# Patient Record
Sex: MALE | Race: WHITE | NOT HISPANIC OR LATINO | Employment: OTHER | URBAN - METROPOLITAN AREA
[De-identification: names, ages, dates, MRNs, and addresses within clinical notes are randomized per-mention and may not be internally consistent; named-entity substitution may affect disease eponyms.]

---

## 2021-05-31 ENCOUNTER — RECORDS - HEALTHEAST (OUTPATIENT)
Dept: ADMINISTRATIVE | Facility: CLINIC | Age: 86
End: 2021-05-31

## 2022-10-30 ENCOUNTER — ANESTHESIA EVENT (OUTPATIENT)
Dept: SURGERY | Facility: CLINIC | Age: 87
End: 2022-10-30
Payer: COMMERCIAL

## 2022-10-31 ENCOUNTER — HOSPITAL ENCOUNTER (OUTPATIENT)
Facility: CLINIC | Age: 87
Discharge: HOME | End: 2022-10-31
Attending: INTERNAL MEDICINE | Admitting: INTERNAL MEDICINE
Payer: COMMERCIAL

## 2022-10-31 ENCOUNTER — ANESTHESIA (OUTPATIENT)
Dept: SURGERY | Facility: CLINIC | Age: 87
End: 2022-10-31
Payer: COMMERCIAL

## 2022-10-31 VITALS
OXYGEN SATURATION: 100 % | WEIGHT: 165 LBS | BODY MASS INDEX: 25.9 KG/M2 | HEART RATE: 75 BPM | RESPIRATION RATE: 17 BRPM | HEIGHT: 67 IN | DIASTOLIC BLOOD PRESSURE: 71 MMHG | TEMPERATURE: 99.4 F | SYSTOLIC BLOOD PRESSURE: 136 MMHG

## 2022-10-31 LAB
SARS-COV-2 RNA RESP QL NAA+PROBE: NEGATIVE
UPPER GI ENDOSCOPY: NORMAL

## 2022-10-31 PROCEDURE — 370N000017 HC ANESTHESIA TECHNICAL FEE, PER MIN: Performed by: INTERNAL MEDICINE

## 2022-10-31 PROCEDURE — 250N000011 HC RX IP 250 OP 636: Performed by: NURSE ANESTHETIST, CERTIFIED REGISTERED

## 2022-10-31 PROCEDURE — U0005 INFEC AGEN DETEC AMPLI PROBE: HCPCS | Performed by: INTERNAL MEDICINE

## 2022-10-31 PROCEDURE — 999N000141 HC STATISTIC PRE-PROCEDURE NURSING ASSESSMENT: Performed by: INTERNAL MEDICINE

## 2022-10-31 PROCEDURE — 710N000012 HC RECOVERY PHASE 2, PER MINUTE: Performed by: INTERNAL MEDICINE

## 2022-10-31 PROCEDURE — 272N000001 HC OR GENERAL SUPPLY STERILE: Performed by: INTERNAL MEDICINE

## 2022-10-31 PROCEDURE — 258N000003 HC RX IP 258 OP 636: Performed by: ANESTHESIOLOGY

## 2022-10-31 PROCEDURE — 360N000075 HC SURGERY LEVEL 2, PER MIN: Performed by: INTERNAL MEDICINE

## 2022-10-31 RX ORDER — SPIRONOLACTONE 25 MG
TABLET ORAL
COMMUNITY

## 2022-10-31 RX ORDER — FENTANYL CITRATE 50 UG/ML
25 INJECTION, SOLUTION INTRAMUSCULAR; INTRAVENOUS
Status: CANCELLED | OUTPATIENT
Start: 2022-10-31

## 2022-10-31 RX ORDER — SODIUM CHLORIDE, SODIUM LACTATE, POTASSIUM CHLORIDE, CALCIUM CHLORIDE 600; 310; 30; 20 MG/100ML; MG/100ML; MG/100ML; MG/100ML
INJECTION, SOLUTION INTRAVENOUS CONTINUOUS
Status: CANCELLED | OUTPATIENT
Start: 2022-10-31

## 2022-10-31 RX ORDER — ATORVASTATIN CALCIUM 80 MG/1
80 TABLET, FILM COATED ORAL DAILY
COMMUNITY

## 2022-10-31 RX ORDER — ONDANSETRON 4 MG/1
4 TABLET, ORALLY DISINTEGRATING ORAL EVERY 30 MIN PRN
Status: CANCELLED | OUTPATIENT
Start: 2022-10-31

## 2022-10-31 RX ORDER — ISOSORBIDE DINITRATE 10 MG/1
10 TABLET ORAL
COMMUNITY

## 2022-10-31 RX ORDER — OXYCODONE HYDROCHLORIDE 5 MG/1
5 TABLET ORAL EVERY 4 HOURS PRN
Status: CANCELLED | OUTPATIENT
Start: 2022-10-31

## 2022-10-31 RX ORDER — PROPOFOL 10 MG/ML
INJECTION, EMULSION INTRAVENOUS CONTINUOUS PRN
Status: DISCONTINUED | OUTPATIENT
Start: 2022-10-31 | End: 2022-10-31

## 2022-10-31 RX ORDER — FENTANYL CITRATE 50 UG/ML
25 INJECTION, SOLUTION INTRAMUSCULAR; INTRAVENOUS EVERY 5 MIN PRN
Status: CANCELLED | OUTPATIENT
Start: 2022-10-31

## 2022-10-31 RX ORDER — LIDOCAINE 40 MG/G
CREAM TOPICAL
Status: DISCONTINUED | OUTPATIENT
Start: 2022-10-31 | End: 2022-10-31 | Stop reason: HOSPADM

## 2022-10-31 RX ORDER — MEPERIDINE HYDROCHLORIDE 50 MG/ML
12.5 INJECTION INTRAMUSCULAR; INTRAVENOUS; SUBCUTANEOUS
Status: CANCELLED | OUTPATIENT
Start: 2022-10-31

## 2022-10-31 RX ORDER — FAMOTIDINE 20 MG/1
20 TABLET, FILM COATED ORAL 2 TIMES DAILY
COMMUNITY

## 2022-10-31 RX ORDER — ONDANSETRON 2 MG/ML
4 INJECTION INTRAMUSCULAR; INTRAVENOUS EVERY 30 MIN PRN
Status: CANCELLED | OUTPATIENT
Start: 2022-10-31

## 2022-10-31 RX ORDER — SODIUM CHLORIDE, SODIUM LACTATE, POTASSIUM CHLORIDE, CALCIUM CHLORIDE 600; 310; 30; 20 MG/100ML; MG/100ML; MG/100ML; MG/100ML
INJECTION, SOLUTION INTRAVENOUS CONTINUOUS
Status: DISCONTINUED | OUTPATIENT
Start: 2022-10-31 | End: 2022-10-31 | Stop reason: HOSPADM

## 2022-10-31 RX ADMIN — PROPOFOL 200 MCG/KG/MIN: 10 INJECTION, EMULSION INTRAVENOUS at 12:30

## 2022-10-31 RX ADMIN — SODIUM CHLORIDE, POTASSIUM CHLORIDE, SODIUM LACTATE AND CALCIUM CHLORIDE: 600; 310; 30; 20 INJECTION, SOLUTION INTRAVENOUS at 09:49

## 2022-10-31 ASSESSMENT — ACTIVITIES OF DAILY LIVING (ADL)
ADLS_ACUITY_SCORE: 33
ADLS_ACUITY_SCORE: 35
ADLS_ACUITY_SCORE: 35

## 2022-10-31 NOTE — BRIEF OP NOTE
[facility]    Brief Operative Note    Pre-operative diagnosis: Gastroesophageal reflux [K21.9]  Post-operative diagnosis Dysphagia    Procedure: Procedure(s):  ESOPHAGOGASTRODUODENOSCOPY (EGD) with esophageal dilation  Surgeon: Surgeon(s) and Role:     * Shashi Tanner MD - Primary  Anesthesia: MAC   Estimated Blood Loss: None    Drains: None  Specimens: * No specimens in log *  Findings:    1. Hypertonic LES; dilated  2. Normal stomach  3. Normal duodenum    Complications: None.  Implants: * No implants in log *

## 2022-10-31 NOTE — ANESTHESIA CARE TRANSFER NOTE
Patient: Waylon Corey    Procedure: Procedure(s):  ESOPHAGOGASTRODUODENOSCOPY (EGD) with esophageal dilation       Diagnosis: Gastroesophageal reflux [K21.9]  Diagnosis Additional Information: No value filed.    Anesthesia Type:   MAC     Note:    Oropharynx: oropharynx clear of all foreign objects  Level of Consciousness: drowsy  Oxygen Supplementation: face mask  Level of Supplemental Oxygen (L/min / FiO2): 8  Independent Airway: airway patency satisfactory and stable  Dentition: dentition unchanged  Vital Signs Stable: post-procedure vital signs reviewed and stable  Report to RN Given: handoff report given  Patient transferred to: Phase II    Handoff Report: Identifed the Patient, Identified the Reponsible Provider, Reviewed the pertinent medical history, Discussed the surgical course, Reviewed Intra-OP anesthesia mangement and issues during anesthesia, Set expectations for post-procedure period and Allowed opportunity for questions and acknowledgement of understanding      Vitals:  Vitals Value Taken Time   BP     Temp     Pulse     Resp     SpO2         Electronically Signed By: JAKE Craven CRNA  October 31, 2022  12:52 PM

## 2022-10-31 NOTE — OP NOTE
Please see Provation note for details.    Shashi Tanner MD on 10/31/2022 at 12:51 PM  [facility]

## 2022-10-31 NOTE — INTERVAL H&P NOTE
"I have reviewed the surgical (or preoperative) H&P that is linked to this encounter, and examined the patient. There are no significant changes    Clinical Conditions Present on Arrival:  Clinically Significant Risk Factors Present on Admission                    # Overweight: Estimated body mass index is 25.84 kg/m  as calculated from the following:    Height as of this encounter: 1.702 m (5' 7\").    Weight as of this encounter: 74.8 kg (165 lb).       "

## 2022-10-31 NOTE — ANESTHESIA POSTPROCEDURE EVALUATION
Patient: Waylon Corey    Procedure: Procedure(s):  ESOPHAGOGASTRODUODENOSCOPY (EGD) with esophageal dilation       Anesthesia Type:  MAC    Note:  Disposition: Outpatient   Postop Pain Control: Uneventful            Sign Out: Well controlled pain   PONV: No   Neuro/Psych: Uneventful            Sign Out: Acceptable/Baseline neuro status   Airway/Respiratory: Uneventful            Sign Out: Acceptable/Baseline resp. status   CV/Hemodynamics: Uneventful            Sign Out: Acceptable CV status; No obvious hypovolemia; No obvious fluid overload   Other NRE: NONE   DID A NON-ROUTINE EVENT OCCUR? No           Last vitals:  Vitals Value Taken Time   /71 10/31/22 1305   Temp 37.4  C (99.4  F) 10/31/22 1305   Pulse 75 10/31/22 1305   Resp 17 10/31/22 1305   SpO2 100 % 10/31/22 1305       Electronically Signed By: Benjie Vanegas MD  October 31, 2022  1:19 PM

## 2022-10-31 NOTE — ANESTHESIA PREPROCEDURE EVALUATION
Anesthesia Pre-Procedure Evaluation    Patient: Waylon Corey   MRN: [id number] : 1935        Procedure : Procedure(s):  ESOPHAGOGASTRODUODENOSCOPY (EGD)          Past Medical History:   Diagnosis Date     Basal cell carcinoma       Past Surgical History:   Procedure Laterality Date     MOHS MICROGRAPHIC PROCEDURE        Allergies   Allergen Reactions     Nkda [No Known Drug Allergies]       Social History     Tobacco Use     Smoking status: Former     Types: Cigarettes     Smokeless tobacco: Never     Tobacco comments:     Stopped age 21   Substance Use Topics     Alcohol use: Not on file      Wt Readings from Last 1 Encounters:   No data found for Wt        Anesthesia Evaluation   Pt has had prior anesthetic.     No history of anesthetic complications       ROS/MED HX  ENT/Pulmonary:  - neg pulmonary ROS     Neurologic:  - neg neurologic ROS     Cardiovascular:     (+) --CAD ---    METS/Exercise Tolerance:     Hematologic:  - neg hematologic  ROS     Musculoskeletal:  - neg musculoskeletal ROS     GI/Hepatic:     (+) GERD, hiatal hernia,     Renal/Genitourinary:  - neg Renal ROS     Endo:  - neg endo ROS     Psychiatric/Substance Use:  - neg psychiatric ROS     Infectious Disease:  - neg infectious disease ROS     Malignancy:  - neg malignancy ROS     Other:            Physical Exam    Airway  airway exam normal      Mallampati: I    Neck ROM: full     Respiratory Devices and Support         Dental           Cardiovascular   cardiovascular exam normal       Rhythm and rate: regular and normal     Pulmonary   pulmonary exam normal        breath sounds clear to auscultation           OUTSIDE LABS:  CBC: No results found for: WBC, HGB, HCT, PLT  BMP: No results found for: NA, POTASSIUM, CHLORIDE, CO2, BUN, CR, GLC  COAGS: No results found for: PTT, INR, FIBR  POC: No results found for: BGM, HCG, HCGS  HEPATIC: No results found for: ALBUMIN, PROTTOTAL, ALT, AST, GGT, ALKPHOS, BILITOTAL, BILIDIRECT,  MAX  OTHER: No results found for: PH, LACT, A1C, KATHARINA, PHOS, MAG, LIPASE, AMYLASE, TSH, T4, T3, CRP, SED    Anesthesia Plan    ASA Status:  3      Anesthesia Type: MAC.              Consents    Anesthesia Plan(s) and associated risks, benefits, and realistic alternatives discussed. Questions answered and patient/representative(s) expressed understanding.    - Discussed:     - Discussed with:  Patient         Postoperative Care            Comments:                Benjie Vanegas MD

## 2024-06-10 ENCOUNTER — TELEPHONE (OUTPATIENT)
Dept: OPHTHALMOLOGY | Facility: CLINIC | Age: 89
End: 2024-06-10
Payer: COMMERCIAL

## 2024-06-10 ENCOUNTER — TRANSFERRED RECORDS (OUTPATIENT)
Dept: HEALTH INFORMATION MANAGEMENT | Facility: CLINIC | Age: 89
End: 2024-06-10
Payer: COMMERCIAL

## 2024-06-10 NOTE — TELEPHONE ENCOUNTER
Health Call Center    Phone Message    May a detailed message be left on voicemail: yes     Reason for Call: Appointment Intake    Referring Provider Name: Jessica Stack, [facility] *ref in progress*  Diagnosis and/or Symptoms: Biopsy of conjuctiva, due to irritation of symblepharon with cornea.    Writer unable to schedule biopsy. Please reach out to pt for further scheduling options and reach out to [facility] eye clinic for further details.    Thank You!    Action Taken: Message routed to:  [facility]    Travel Screening: Not Applicable     Date of Service:

## 2024-06-11 NOTE — TELEPHONE ENCOUNTER
Called and spoke to Juan Carlos Rangel has a hard time hearing on the phone - may need to repeat     Made him an appointment for July with dr. Crane     Discussed     Wait time     Billing . Insurance     Requested a new pt packet - we are not able to email due to HIPAA     Eliz Mcgrath Communication Facilitator on 6/11/2024 at 2:04 PM

## 2024-06-12 ENCOUNTER — TRANSCRIBE ORDERS (OUTPATIENT)
Dept: OTHER | Age: 89
End: 2024-06-12

## 2024-06-12 DIAGNOSIS — H11.239: Primary | ICD-10-CM

## 2024-07-03 ENCOUNTER — OFFICE VISIT (OUTPATIENT)
Dept: OPHTHALMOLOGY | Facility: CLINIC | Age: 89
End: 2024-07-03
Attending: OPHTHALMOLOGY
Payer: COMMERCIAL

## 2024-07-03 DIAGNOSIS — H11.232 SYMBLEPHARON, LEFT EYE: Primary | ICD-10-CM

## 2024-07-03 PROCEDURE — 88305 TISSUE EXAM BY PATHOLOGIST: CPT | Mod: 26 | Performed by: DERMATOLOGY

## 2024-07-03 PROCEDURE — 88346 IMFLUOR 1ST 1ANTB STAIN PX: CPT | Mod: 26 | Performed by: DERMATOLOGY

## 2024-07-03 PROCEDURE — 99204 OFFICE O/P NEW MOD 45 MIN: CPT | Mod: 25 | Performed by: OPHTHALMOLOGY

## 2024-07-03 PROCEDURE — 88346 IMFLUOR 1ST 1ANTB STAIN PX: CPT | Mod: TC | Performed by: OPHTHALMOLOGY

## 2024-07-03 PROCEDURE — 68100 BIOPSY CONJUNCTIVA: CPT | Performed by: OPHTHALMOLOGY

## 2024-07-03 PROCEDURE — G0463 HOSPITAL OUTPT CLINIC VISIT: HCPCS | Performed by: OPHTHALMOLOGY

## 2024-07-03 PROCEDURE — 92285 EXTERNAL OCULAR PHOTOGRAPHY: CPT | Performed by: OPHTHALMOLOGY

## 2024-07-03 PROCEDURE — 88350 IMFLUOR EA ADDL 1ANTB STN PX: CPT | Mod: 26 | Performed by: DERMATOLOGY

## 2024-07-03 RX ORDER — NEOMYCIN SULFATE, POLYMYXIN B SULFATE, AND DEXAMETHASONE 3.5; 10000; 1 MG/G; [USP'U]/G; MG/G
0.5 OINTMENT OPHTHALMIC 3 TIMES DAILY
Qty: 3.5 G | Refills: 11 | Status: SHIPPED | OUTPATIENT
Start: 2024-07-03

## 2024-07-03 ASSESSMENT — REFRACTION_WEARINGRX
OD_AXIS: 173
OS_SPHERE: +0.00
OS_CYLINDER: +0.25
OS_AXIS: 175
OD_CYLINDER: +2.00
OD_SPHERE: -0.75

## 2024-07-03 ASSESSMENT — SLIT LAMP EXAM - LIDS: COMMENTS: NORMAL

## 2024-07-03 ASSESSMENT — VISUAL ACUITY
OD_CC: 20/20
METHOD: SNELLEN - LINEAR
OS_PH_CC: 20/25
OS_CC: 20/40
CORRECTION_TYPE: GLASSES

## 2024-07-03 ASSESSMENT — CONF VISUAL FIELD
OS_SUPERIOR_TEMPORAL_RESTRICTION: 0
OS_INFERIOR_NASAL_RESTRICTION: 0
OD_NORMAL: 1
OD_SUPERIOR_NASAL_RESTRICTION: 0
OS_INFERIOR_TEMPORAL_RESTRICTION: 0
OD_INFERIOR_NASAL_RESTRICTION: 0
OS_SUPERIOR_NASAL_RESTRICTION: 0
OD_INFERIOR_TEMPORAL_RESTRICTION: 0
OD_SUPERIOR_TEMPORAL_RESTRICTION: 0
OS_NORMAL: 1

## 2024-07-03 ASSESSMENT — EXTERNAL EXAM - LEFT EYE: OS_EXAM: NORMAL

## 2024-07-03 ASSESSMENT — TONOMETRY
IOP_METHOD: ICARE
OD_IOP_MMHG: 09
OS_IOP_MMHG: 12

## 2024-07-03 NOTE — PROGRESS NOTES
Chief complaint   Evaluation of symblepharon     HPI    Waylon Corey 89 year old male       Chief Complaint(s) and History of Present Illness(es)       Corneal Evaluation    In both eyes.  This started 1 year ago.             Comments    Pt. States that he has been noticing worsening VA LE over the last year. Was diagnosed with AMD. No pain BE. Pt. Not sure why he is here today-was referred by [facility].   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, mild ERM not visually significant   CTL wearer:No  Glasses : Yes   Family Hx of eye disease: None     PMH     Past Medical History:   Diagnosis Date    Basal cell carcinoma        PSH     Past Surgical History:   Procedure Laterality Date    CATARACT IOL, RT/LT Bilateral     ESOPHAGOSCOPY, GASTROSCOPY, DUODENOSCOPY (EGD), COMBINED N/A 10/31/2022    Procedure: ESOPHAGOGASTRODUODENOSCOPY (EGD) with esophageal dilation;  Surgeon: Shashi Tanner MD;  Location: [facility] OR    MOHS MICROGRAPHIC PROCEDURE         Meds     Current Outpatient Medications   Medication Sig Dispense Refill    aspirin 81 MG tablet Take 1 tablet by mouth daily.      atorvastatin (LIPITOR) 80 MG tablet Take 80 mg by mouth daily      CALCIUM-VITAMIN D PO Take 1 tablet by mouth daily.      capsaicin (CAPSAICIN APR) 0.025 % CREA Apply 3 x a day to the left shoulder itch. Can cause burning feeling that improves with use. Can dilute with moisturizer. Use for 4-6 weeks. 56.6 g 11    Coenzyme Q10 (COQ10 PO) Take 1 capsule by mouth daily.      famotidine (PEPCID) 20 MG tablet Take 20 mg by mouth 2 times daily      GARLIC OIL PO Take 1 capsule by mouth daily.      isosorbide dinitrate (ISORDIL) 10 MG tablet Take 10 mg by mouth 3 times daily      ketoconazole (NIZORAL) 2 % shampoo Lather onto scalp and face and let sit for 3-5 minutes and rinse off. Use as  needed. Can use on the face more frequently as a face wash. 120 mL 11    ketoconazole (NIZORAL) 2 % shampoo Apply to scalp and then wash off after 5 minutes three times a week. 120 mL 11    Levothyroxine Sodium (SYNTHROID PO) Take 112 mcg by mouth daily      Lutein 20 MG CAPS       METOPROLOL TARTRATE PO Take 25 mg by mouth 2 times daily 1/2 of 50mg tablet      metroNIDAZOLE (METROCREAM) 0.75 % cream Apply once or twice a day to affected area of the face 45 g 11    metroNIDAZOLE (METROCREAM) 0.75 % cream Apply to face twice daily. 45 g 11    nitroGLYCERIN (NITROSTAT) 0.4 MG SL tablet Place 0.4 mg under the tongue every 5 minutes as needed.      Pantoprazole Sodium (PROTONIX PO) Take 1 tablet by mouth daily.       No current facility-administered medications for this visit.       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  R/B/A of surgical biopsy discussed patient agree to proceed today  Start maxitrol ointment 3 times daily for 2 weeks    # Skin lesion, right       Follow up:  Oph: 3 weeks        Attending Physician Attestation: Complete documentation of historical and exam elements from today's encounter can be found in the full encounter summary report (not reduplicated in this progress note). I personally obtained the chief complaint(s) and history of present illness. I confirmed and edited as necessary the review of systems, past medical/surgical history, family history, social history, and examination findings as documented by others; and I examined the patient myself. I personally reviewed the relevant tests, images, and reports as documented above. I formulated and edited as necessary the assessment and plan and discussed the findings and management plan with the patient and family. I personally reviewed the ophthalmic test(s) associated with this encounter, agree with the  interpretation(s) as documented by the resident/fellow, and have edited the corresponding report(s) as necessary. I was present for the entire procedure(s). - Malgorzata Crane M.D

## 2024-07-03 NOTE — NURSING NOTE
Chief Complaints and History of Present Illnesses   Patient presents with    Corneal Evaluation     Chief Complaint(s) and History of Present Illness(es)       Corneal Evaluation              Laterality: both eyes    Onset: 1 year ago              Comments    Pt. States that he has been noticing worsening VA LE over the last year. Was diagnosed with AMD. No pain BE. Pt. Not sure why he is here today-was referred by [facility].   Jessica Villalpando COT 8:18 AM July 3, 2024

## 2024-07-12 LAB
PATH REPORT.COMMENTS IMP SPEC: NORMAL
PATH REPORT.FINAL DX SPEC: NORMAL
PATH REPORT.GROSS SPEC: NORMAL
PATH REPORT.MICROSCOPIC SPEC OTHER STN: NORMAL
PATH REPORT.RELEVANT HX SPEC: NORMAL
PHOTO IMAGE: NORMAL

## 2024-07-22 ENCOUNTER — OFFICE VISIT (OUTPATIENT)
Dept: OPHTHALMOLOGY | Facility: CLINIC | Age: 89
End: 2024-07-22
Attending: OPHTHALMOLOGY
Payer: COMMERCIAL

## 2024-07-22 DIAGNOSIS — H11.232 SYMBLEPHARON, LEFT EYE: Primary | ICD-10-CM

## 2024-07-22 DIAGNOSIS — H04.123 DRY EYE SYNDROME OF BOTH EYES: ICD-10-CM

## 2024-07-22 DIAGNOSIS — H02.056: ICD-10-CM

## 2024-07-22 PROCEDURE — 99213 OFFICE O/P EST LOW 20 MIN: CPT | Mod: GC | Performed by: OPHTHALMOLOGY

## 2024-07-22 PROCEDURE — G0463 HOSPITAL OUTPT CLINIC VISIT: HCPCS | Performed by: OPHTHALMOLOGY

## 2024-07-22 RX ORDER — CYCLOSPORINE 0.5 MG/ML
1 EMULSION OPHTHALMIC 2 TIMES DAILY
Qty: 60 EACH | Refills: 11 | Status: SHIPPED | OUTPATIENT
Start: 2024-07-22

## 2024-07-22 ASSESSMENT — REFRACTION_WEARINGRX
OD_AXIS: 173
OS_AXIS: 175
OD_CYLINDER: +2.00
OS_CYLINDER: +0.25
OS_SPHERE: +0.00
OD_SPHERE: -0.75

## 2024-07-22 ASSESSMENT — TONOMETRY
OS_IOP_MMHG: 13
OD_IOP_MMHG: 8
IOP_METHOD: ICARE

## 2024-07-22 ASSESSMENT — VISUAL ACUITY
OS_CC: 20/40
OD_CC: 20/25
CORRECTION_TYPE: GLASSES
OS_CC+: -1
METHOD: SNELLEN - LINEAR
OD_CC+: -1

## 2024-07-22 ASSESSMENT — SLIT LAMP EXAM - LIDS: COMMENTS: NORMAL

## 2024-07-22 ASSESSMENT — EXTERNAL EXAM - LEFT EYE: OS_EXAM: NORMAL

## 2024-07-22 NOTE — NURSING NOTE
"Chief Complaints and History of Present Illnesses   Patient presents with    Follow Up     Symblepharon, left eye     Chief Complaint(s) and History of Present Illness(es)       Follow Up              Laterality: left eye    Associated symptoms: tearing.  Negative for dryness, eye pain, flashes and floaters    Treatments tried: ointment    Pain scale: 0/10    Comments: Symblepharon, left eye              Comments    Pt states vision is the same.  No pain.  Pt states he \"has the AMD\".  Karel gael as prescribed.  No other ocular meds.    LINDSAY See July 22, 2024 9:32 AM                       "

## 2024-07-22 NOTE — PROGRESS NOTES
"Chief complaint   Evaluation of symblepharon     HPI    Waylon Corey 89 year old male       Chief Complaint(s) and History of Present Illness(es)       Corneal Evaluation    In both eyes.  This started 1 year ago.             Comments    Pt. States that he has been noticing worsening VA LE over the last year. Was diagnosed with AMD. No pain BE. Pt. Not sure why he is here today-was referred by [facility].   Jessica Villalpando COT 8:18 AM July 3, 2024                        Interval hx 07/22/2024  Chief Complaint(s) and History of Present Illness(es)       Follow Up    In left eye.  Associated symptoms include tearing.  Negative for dryness, eye pain, flashes and floaters.  Treatments tried include ointment.  Pain was noted as 0/10. Additional comments: Symblepharon, left eye             Comments    Pt states vision is the same.  No pain.  Pt states he \"has the AMD\".  Karel gael as prescribed.  No other ocular meds.    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, mild ERM not visually significant   CTL wearer:No  Glasses : Yes   Family Hx of eye disease: None     PMH     Past Medical History:   Diagnosis Date    Basal cell carcinoma        PSH     Past Surgical History:   Procedure Laterality Date    CATARACT IOL, RT/LT Bilateral     ESOPHAGOSCOPY, GASTROSCOPY, DUODENOSCOPY (EGD), COMBINED N/A 10/31/2022    Procedure: ESOPHAGOGASTRODUODENOSCOPY (EGD) with esophageal dilation;  Surgeon: Shashi Tanner MD;  Location: [facility] OR    MOHS MICROGRAPHIC PROCEDURE         Meds     Current Outpatient Medications   Medication Sig Dispense Refill    aspirin 81 MG tablet Take 1 tablet by mouth daily.      atorvastatin (LIPITOR) 80 MG tablet Take 80 mg by mouth daily      CALCIUM-VITAMIN D PO Take 1 tablet by mouth daily.      capsaicin (CAPSAICIN APR) 0.025 % CREA Apply 3 x a day to " the left shoulder itch. Can cause burning feeling that improves with use. Can dilute with moisturizer. Use for 4-6 weeks. 56.6 g 11    Coenzyme Q10 (COQ10 PO) Take 1 capsule by mouth daily.      famotidine (PEPCID) 20 MG tablet Take 20 mg by mouth 2 times daily      GARLIC OIL PO Take 1 capsule by mouth daily.      isosorbide dinitrate (ISORDIL) 10 MG tablet Take 10 mg by mouth 3 times daily      ketoconazole (NIZORAL) 2 % shampoo Lather onto scalp and face and let sit for 3-5 minutes and rinse off. Use as needed. Can use on the face more frequently as a face wash. 120 mL 11    ketoconazole (NIZORAL) 2 % shampoo Apply to scalp and then wash off after 5 minutes three times a week. 120 mL 11    Levothyroxine Sodium (SYNTHROID PO) Take 112 mcg by mouth daily      Lutein 20 MG CAPS       METOPROLOL TARTRATE PO Take 25 mg by mouth 2 times daily 1/2 of 50mg tablet      metroNIDAZOLE (METROCREAM) 0.75 % cream Apply once or twice a day to affected area of the face 45 g 11    metroNIDAZOLE (METROCREAM) 0.75 % cream Apply to face twice daily. 45 g 11    neomycin-polymyxin-dexAMETHasone (MAXITROL) 3.5-08607-0.1 ophthalmic ointment Place 0.1429 Applications (0.5 g) Into the left eye 3 times daily 3.5 g 11    nitroGLYCERIN (NITROSTAT) 0.4 MG SL tablet Place 0.4 mg under the tongue every 5 minutes as needed.      Pantoprazole Sodium (PROTONIX PO) Take 1 tablet by mouth daily.       No current facility-administered medications for this visit.       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 Together with a negative immunofluorescence study, the findings present in the specimen are not entirely specific for a particular diagnosis. The presence of neutrophils within the epithelium and beneath the  epithelial surface of this transitional specimen may represent external trauma or an irritant phenomenon (which is slightly favored, given the presence of acanthosis). While the direct immunofluorescence study is negative, a subepidermal neutrophilic blistering condition such as cicatricial pemphigoid cannot be entirely excluded (as up to 40% of patients with this condition may have negative immunofluorescence studies - see PMID [id number]). Ongoing clinical correlation is recommended.     Imaging   Slit lamp     Drops Currently Taking   None     Assessment/Plan 07/22/2024   # Symblepharon, left   - Biopsy negative for malignancy   - Patient does report a history of rubbing the left eye because he thought his vision was blurry from AMD; states that he did not rub his right eye  - Etiology of symblepharon secondary to mild OCP vs from micro trauma   Bx reassuring    Plan  Continue erythromycin QID  -refer to plastics for entropion repair    #dry eye syndrome ou  Start restasis OU  It will help decrease the local inflamation that might be increasing the symblepharon    # Trichiasis, left  - Significant trichiasis of upper and lower, involving nasal 1/3 of lids  - Deferred mechanical epilation given extensive trichiasis  -refer to plastics as high risk for complications from the current trichiasis left eye    # Skin lesion, right       Follow up:  Oph: 6 months with local ophthalmologist, 1-2 months with plastics    Marianela Duvall MD  PGY-3  Department of Ophthalmology  July 22, 2024 10:26 AM   Attending Physician Attestation:  Complete documentation of historical and exam elements from today's encounter can be found in the full encounter summary report (not reduplicated in this progress note).  I personally obtained the chief complaint(s) and history of present illness.  I confirmed and edited as necessary the review of systems, past medical/surgical history, family history, social history, and examination findings as  documented by others; and I examined the patient myself.  I personally reviewed the relevant tests, images, and reports as documented above.  I formulated and edited as necessary the assessment and plan and discussed the findings and management plan with the patient and family. - Malgorzata Crane MD

## 2024-10-18 ENCOUNTER — TELEPHONE (OUTPATIENT)
Dept: OPHTHALMOLOGY | Facility: CLINIC | Age: 89
End: 2024-10-18
Payer: COMMERCIAL

## 2024-10-18 NOTE — TELEPHONE ENCOUNTER
Spoke with patient confirming appointment on 10/22/24. Provided appointment details over the phone.-Per Patient

## 2024-10-22 ENCOUNTER — OFFICE VISIT (OUTPATIENT)
Dept: OPHTHALMOLOGY | Facility: CLINIC | Age: 89
End: 2024-10-22
Payer: COMMERCIAL

## 2024-10-22 ENCOUNTER — PRE VISIT (OUTPATIENT)
Dept: OPHTHALMOLOGY | Facility: CLINIC | Age: 89
End: 2024-10-22

## 2024-10-22 DIAGNOSIS — H02.056: ICD-10-CM

## 2024-10-22 DIAGNOSIS — H02.016 CICATRICIAL ENTROPION OF LEFT EYE: Primary | ICD-10-CM

## 2024-10-22 PROCEDURE — 92285 EXTERNAL OCULAR PHOTOGRAPHY: CPT | Mod: GC | Performed by: OPHTHALMOLOGY

## 2024-10-22 PROCEDURE — 99214 OFFICE O/P EST MOD 30 MIN: CPT | Mod: GC | Performed by: OPHTHALMOLOGY

## 2024-10-22 ASSESSMENT — REFRACTION_WEARINGRX
OS_CYLINDER: +0.25
OS_SPHERE: PLANO
OD_SPHERE: -0.75
OD_CYLINDER: +2.00
OS_AXIS: 175
OD_AXIS: 173

## 2024-10-22 ASSESSMENT — VISUAL ACUITY
OS_CC: 20/40
OS_CC+: +1
METHOD: SNELLEN - LINEAR
CORRECTION_TYPE: GLASSES
OD_CC: 20/40
OD_CC+: +2

## 2024-10-22 ASSESSMENT — TONOMETRY
OS_IOP_MMHG: 12
IOP_METHOD: ICARE
OD_IOP_MMHG: 9

## 2024-10-22 ASSESSMENT — EXTERNAL EXAM - LEFT EYE: OS_EXAM: NORMAL

## 2024-10-22 ASSESSMENT — SLIT LAMP EXAM - LIDS: COMMENTS: NORMAL

## 2024-10-22 NOTE — LETTER
10/22/2024         RE:  :  MRN: Waylon Corey  1935  [id number]     Dear Dr. Malgorzata Crane,    Thank you for asking me to see your patient, Waylon Corey, for an oculoplastic   consultation.  My assessment and plan are below.  For further details, please see my attached clinic note.      Chief Complaint(s) and History of Present Illness(es)     Trichiasis Evaluation            Associated signs and symptoms: tearing.  Negative for eye pain    Treatments tried: eye drops    Comments: Waylon Corey is being seen for a consult today by the request   of Dr. Crane for Trichiasis, left eye.           Comments    Pt states he has no problems with his eyelashes.   Denies eye pain or discomfort.   Has not been using his eyedrops much.   Occasional tearing left eye.     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 Crnae for evaluation of cicatricial trichiasis and entropion in setting of possible OCP (conjunctial biopsy negative). Patient reports that he used to rub the eyes frequently.     Exam with extensive trichiasis left eye upper and lower lid, greatest superomedially where there is greater cicatricial entropion. There is also milder trichiasis and cicatricial entropion of the left lower lid. Cornea appears clear. Patient is not symptomatically bothered however he does report some epiphora of left eye.    Plan:  - Discussed options of RFE in clinic today vs. upper and lower eyelid entropion repair with intraoperative RFE    Patient disposition:   No follow-ups on file.     Berhane Trinidad,  MD  PGY-2 Resident  Department of Ophthalmology  October 22, 2024 10:25 AM        Again, thank you for allowing me to participate in the care of your patient.      Sincerely,    Maninder Dickerson MD  Department of Ophthalmology and Visual Neurosciences  [facility]    CC: Malgorzata Crane MD  [address] [facility] [address]  Via In Basket     Srinath Xiao MD  [facility] [address] [address]  Via In Basket

## 2024-10-22 NOTE — PROGRESS NOTES
Chief Complaint(s) and History of Present Illness(es)     Trichiasis Evaluation            Associated signs and symptoms: tearing.  Negative for eye pain    Treatments tried: eye drops    Comments: Waylon Corey is being seen for a consult today by the request   of Dr. Crane for Trichiasis, left eye.           Comments    Pt states he has no problems with his eyelashes.   Denies eye pain or discomfort.   Has not been using his eyedrops much.   Occasional tearing left eye.     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 Crane for evaluation of cicatricial trichiasis and entropion in setting of possible OCP (conjunctial biopsy negative). Patient reports that he used to rub the eyes frequently.     Exam with extensive trichiasis left eye upper and lower lid, greatest superomedially where there is greater cicatricial entropion. There is also milder trichiasis and cicatricial entropion of the left lower lid. Cornea appears clear. Patient is not symptomatically bothered however he does report some epiphora of left eye.    Plan:  - Discussed options of RFE in clinic today vs. upper and lower eyelid entropion repair with intraoperative RFE    Patient disposition:   No follow-ups on file.     Berhane Trinidad MD  PGY-2 Resident  Department of Ophthalmology  October 22, 2024 10:25 AM    Agree with above.   He has extensive symblepharon nasal left upper eyelid.   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   I recommend left upper eyelid lysis of symblepharon and reforming superior fornix. Possible need for excision of lash line, or anterior approach entropion repair as well. Sub conj steroid injection. Placement of Kontour contact lens.    In the interim, continue EES gael as prescribed by Dr. Crane.       Attending Physician Attestation: Complete documentation of historical and exam elements from today's encounter can be found in the full encounter summary report (not reduplicated in this progress note). I personally obtained the chief complaint(s) and history of present illness. I confirmed and edited as necessary the review of systems, past medical/surgical history, family history, social history, and examination findings as documented by others; and I examined the patient myself. I personally reviewed the relevant tests, images, and reports as documented above. I formulated and edited as necessary the assessment and plan and discussed the findings and management plan with the patient.  -Maninder Dickerson MD    Today with Waylon Corey, I reviewed the indications, risks, benefits, and alternatives of the proposed surgical procedure including, but not limited to, failure obtain the desired result  and need for additional surgery, bleeding, infection, injury to the eye. I emphasized the risk of recurrent entropion or corneal complications, and the remote possibility of permanent damage to any organ system or death with the use of anesthesia.  I provided multiple opportunities for the questions, answered all questions to the best of my ability, and confirmed that my answers and my discussion were understood.   Maninder Dickerson MD

## 2024-10-22 NOTE — NURSING NOTE
Chief Complaints and History of Present Illnesses   Patient presents with    Trichiasis Evaluation     Waylon Corey is being seen for a consult today by the request of Dr. Crane for Trichiasis, left eye.      Chief Complaint(s) and History of Present Illness(es)       Trichiasis Evaluation              Associated signs and symptoms: tearing.  Negative for eye pain    Treatments tried: eye drops    Comments: Waylon Corey is being seen for a consult today by the request of Dr. Crane for Trichiasis, left eye.               Comments    Pt states he has no problems with his eyelashes.   Denies eye pain or discomfort.   Has not been using his eyedrops much.   Occasional tearing left eye.     Pablo Cuevas 10:05 AM October 22, 2024

## 2024-10-25 ENCOUNTER — TELEPHONE (OUTPATIENT)
Dept: OPHTHALMOLOGY | Facility: CLINIC | Age: 89
End: 2024-10-25
Payer: COMMERCIAL

## 2024-10-25 NOTE — TELEPHONE ENCOUNTER
Called patient to schedule surgery with Dr. Dickerson    Spoke with: Waylon    Date(s) of Surgery: 12-6-24    Patient aware of approximate arrival time: No at Pt to get a call a couple of days prior to surgery     Location of surgery: [facility]     Pre-Op H&P: [facility]     Informed patient that they need to call to schedule pre-op H&P within 30 days of surgery date: Yes      Post-Op Appt Dates:        Discussed with patient pre-op RN will call 2-3 days prior to surgery with arrival time and instructions:  Yes       Standard Surgery Packet Sent: Yes 10/25/24  via Mail - Standard      Additional Information Sent in Packet:          Informed patient that they will need an adult  to bring patient home from surgery: Yes  : Wife does not drive, so he will ask his son or a friend to drive him         Additional Comments:        All patients questions were answered and was instructed to review surgical packet and call back [phone] with any questions or concerns.       Judy Knight on 10/25/2024 at 9:21 AM

## 2024-12-05 RX ORDER — ATORVASTATIN CALCIUM 80 MG/1
80 TABLET, FILM COATED ORAL DAILY
COMMUNITY

## 2024-12-05 RX ORDER — CHOLECALCIFEROL (VITAMIN D3) 50 MCG
1 TABLET ORAL DAILY
COMMUNITY

## 2024-12-05 RX ORDER — HYDROCORTISONE 25 MG/G
CREAM TOPICAL 2 TIMES DAILY
COMMUNITY

## 2024-12-06 ENCOUNTER — HOSPITAL ENCOUNTER (OUTPATIENT)
Facility: CLINIC | Age: 89
Discharge: HOME | End: 2024-12-06
Attending: OPHTHALMOLOGY | Admitting: OPHTHALMOLOGY
Payer: COMMERCIAL

## 2024-12-06 VITALS
TEMPERATURE: 97 F | RESPIRATION RATE: 15 BRPM | OXYGEN SATURATION: 98 % | SYSTOLIC BLOOD PRESSURE: 148 MMHG | DIASTOLIC BLOOD PRESSURE: 74 MMHG | HEIGHT: 67 IN | HEART RATE: 53 BPM | WEIGHT: 125.7 LBS | BODY MASS INDEX: 19.73 KG/M2

## 2024-12-06 DIAGNOSIS — H11.239: Primary | ICD-10-CM

## 2024-12-06 DIAGNOSIS — Z98.890 POSTOPERATIVE EYE STATE: ICD-10-CM

## 2024-12-06 PROCEDURE — 67924 REPAIR EYELID DEFECT: CPT | Mod: E2 | Performed by: OPHTHALMOLOGY

## 2024-12-06 PROCEDURE — 67830 REVISE EYELASHES: CPT | Mod: E1 | Performed by: OPHTHALMOLOGY

## 2024-12-06 PROCEDURE — 272N000001 HC OR GENERAL SUPPLY STERILE: Performed by: OPHTHALMOLOGY

## 2024-12-06 PROCEDURE — 999N000141 HC STATISTIC PRE-PROCEDURE NURSING ASSESSMENT: Performed by: OPHTHALMOLOGY

## 2024-12-06 PROCEDURE — 710N000012 HC RECOVERY PHASE 2, PER MINUTE: Performed by: OPHTHALMOLOGY

## 2024-12-06 PROCEDURE — 250N000009 HC RX 250: Performed by: OPHTHALMOLOGY

## 2024-12-06 PROCEDURE — 360N000076 HC SURGERY LEVEL 3, PER MIN: Performed by: OPHTHALMOLOGY

## 2024-12-06 PROCEDURE — 250N000011 HC RX IP 250 OP 636: Mod: JZ | Performed by: OPHTHALMOLOGY

## 2024-12-06 PROCEDURE — 68340 SEPARATE EYELID ADHESIONS: CPT | Mod: LT | Performed by: OPHTHALMOLOGY

## 2024-12-06 PROCEDURE — 710N000009 HC RECOVERY PHASE 1, LEVEL 1, PER MIN: Performed by: OPHTHALMOLOGY

## 2024-12-06 PROCEDURE — 370N000017 HC ANESTHESIA TECHNICAL FEE, PER MIN: Performed by: OPHTHALMOLOGY

## 2024-12-06 RX ORDER — ERYTHROMYCIN 5 MG/G
OINTMENT OPHTHALMIC PRN
Status: DISCONTINUED | OUTPATIENT
Start: 2024-12-06 | End: 2024-12-06 | Stop reason: HOSPADM

## 2024-12-06 RX ORDER — MOXIFLOXACIN 5 MG/ML
1 SOLUTION/ DROPS OPHTHALMIC 4 TIMES DAILY
Qty: 3 ML | Refills: 3 | Status: SHIPPED | OUTPATIENT
Start: 2024-12-06

## 2024-12-06 RX ORDER — NEOMYCIN POLYMYXIN B SULFATES AND DEXAMETHASONE 3.5; 10000; 1 MG/ML; [USP'U]/ML; MG/ML
SUSPENSION/ DROPS OPHTHALMIC
Qty: 5 ML | Refills: 0 | Status: SHIPPED | OUTPATIENT
Start: 2024-12-06 | End: 2024-12-06

## 2024-12-06 RX ORDER — TETRACAINE HYDROCHLORIDE 5 MG/ML
SOLUTION OPHTHALMIC PRN
Status: DISCONTINUED | OUTPATIENT
Start: 2024-12-06 | End: 2024-12-06 | Stop reason: HOSPADM

## 2024-12-06 RX ORDER — HYDROMORPHONE HCL IN WATER/PF 6 MG/30 ML
0.2 PATIENT CONTROLLED ANALGESIA SYRINGE INTRAVENOUS EVERY 5 MIN PRN
Status: DISCONTINUED | OUTPATIENT
Start: 2024-12-06 | End: 2024-12-06 | Stop reason: HOSPADM

## 2024-12-06 RX ORDER — DEXAMETHASONE SODIUM PHOSPHATE 4 MG/ML
4 INJECTION, SOLUTION INTRA-ARTICULAR; INTRALESIONAL; INTRAMUSCULAR; INTRAVENOUS; SOFT TISSUE
Status: DISCONTINUED | OUTPATIENT
Start: 2024-12-06 | End: 2024-12-06 | Stop reason: HOSPADM

## 2024-12-06 RX ORDER — NEOMYCIN POLYMYXIN B SULFATES AND DEXAMETHASONE 3.5; 10000; 1 MG/ML; [USP'U]/ML; MG/ML
SUSPENSION/ DROPS OPHTHALMIC
Qty: 5 ML | Refills: 0 | Status: SHIPPED | OUTPATIENT
Start: 2024-12-06

## 2024-12-06 RX ORDER — TRANEXAMIC ACID 10 MG/ML
1 INJECTION, SOLUTION INTRAVENOUS ONCE
Status: COMPLETED | OUTPATIENT
Start: 2024-12-06 | End: 2024-12-06

## 2024-12-06 RX ORDER — NALOXONE HYDROCHLORIDE 0.4 MG/ML
0.1 INJECTION, SOLUTION INTRAMUSCULAR; INTRAVENOUS; SUBCUTANEOUS
Status: DISCONTINUED | OUTPATIENT
Start: 2024-12-06 | End: 2024-12-06 | Stop reason: HOSPADM

## 2024-12-06 RX ORDER — SODIUM CHLORIDE, SODIUM LACTATE, POTASSIUM CHLORIDE, CALCIUM CHLORIDE 600; 310; 30; 20 MG/100ML; MG/100ML; MG/100ML; MG/100ML
INJECTION, SOLUTION INTRAVENOUS CONTINUOUS
Status: DISCONTINUED | OUTPATIENT
Start: 2024-12-06 | End: 2024-12-06 | Stop reason: HOSPADM

## 2024-12-06 RX ORDER — ERYTHROMYCIN 5 MG/G
OINTMENT OPHTHALMIC
Qty: 3.5 G | Refills: 1 | Status: SHIPPED | OUTPATIENT
Start: 2024-12-06

## 2024-12-06 RX ORDER — FENTANYL CITRATE 0.05 MG/ML
25 INJECTION, SOLUTION INTRAMUSCULAR; INTRAVENOUS EVERY 5 MIN PRN
Status: DISCONTINUED | OUTPATIENT
Start: 2024-12-06 | End: 2024-12-06 | Stop reason: HOSPADM

## 2024-12-06 RX ORDER — DEXAMETHASONE SODIUM PHOSPHATE 4 MG/ML
INJECTION, SOLUTION INTRA-ARTICULAR; INTRALESIONAL; INTRAMUSCULAR; INTRAVENOUS; SOFT TISSUE
Status: DISCONTINUED
Start: 2024-12-06 | End: 2024-12-06 | Stop reason: HOSPADM

## 2024-12-06 RX ORDER — HYDROMORPHONE HCL IN WATER/PF 6 MG/30 ML
0.4 PATIENT CONTROLLED ANALGESIA SYRINGE INTRAVENOUS EVERY 5 MIN PRN
Status: DISCONTINUED | OUTPATIENT
Start: 2024-12-06 | End: 2024-12-06 | Stop reason: HOSPADM

## 2024-12-06 RX ORDER — DEXAMETHASONE SODIUM PHOSPHATE 4 MG/ML
INJECTION, SOLUTION INTRA-ARTICULAR; INTRALESIONAL; INTRAMUSCULAR; INTRAVENOUS; SOFT TISSUE PRN
Status: DISCONTINUED | OUTPATIENT
Start: 2024-12-06 | End: 2024-12-06 | Stop reason: HOSPADM

## 2024-12-06 RX ORDER — FENTANYL CITRATE 0.05 MG/ML
50 INJECTION, SOLUTION INTRAMUSCULAR; INTRAVENOUS EVERY 5 MIN PRN
Status: DISCONTINUED | OUTPATIENT
Start: 2024-12-06 | End: 2024-12-06 | Stop reason: HOSPADM

## 2024-12-06 RX ORDER — ONDANSETRON 2 MG/ML
4 INJECTION INTRAMUSCULAR; INTRAVENOUS EVERY 30 MIN PRN
Status: DISCONTINUED | OUTPATIENT
Start: 2024-12-06 | End: 2024-12-06 | Stop reason: HOSPADM

## 2024-12-06 RX ORDER — ONDANSETRON 4 MG/1
4 TABLET, ORALLY DISINTEGRATING ORAL EVERY 30 MIN PRN
Status: DISCONTINUED | OUTPATIENT
Start: 2024-12-06 | End: 2024-12-06 | Stop reason: HOSPADM

## 2024-12-06 ASSESSMENT — ACTIVITIES OF DAILY LIVING (ADL)
ADLS_ACUITY_SCORE: 41

## 2024-12-06 NOTE — OP NOTE
Oculoplastic Surgery Operative Note    PREOPERATIVE DIAGNOSIS:    Left upper and lower eyelid cicatricial entropion, and upper and lower eyelid symblepharon     POSTOPERATIVE DIAGNOSIS:    Left upper and lower eyelid cicatricial entropion, and upper and lower eyelid symblepharon     PROCEDURE:   Left superior and inferior fornix conjunctivoplasty with release of symblepharon and reformation of the fornix. Excision of left upper eyelid nasal lash line and left lower eyelid entropion repair.     ANESTHESIA: MAC with local infiltration of 2% Lidocaine with epinephrine and 0.5% Marcaine in 50:50 mixture    SURGEON:  Maninder Dickerson MD    ASSISTANT: Berhane Trinidad MD    ESTIMATED BLOOD LOSS:  2 mL    INDICATIONS:  Waylon Corey presented with left severe cicatricial upper and lower eyelid entropion and shortening of his upper and lower fornix. Risks, benefits, and alternatives to the proposed procedure were discussed, and he elected to proceed.    PROCEDURE: Waylon Corey was brought to the operating room and placed supine on the operating table. Under monitored anesthesia care local anesthetic as above was infiltrated into the left upper and lower eyelid both transconjunctival he and transcutaneously.  He was prepped and draped in typical sterile fashion.  Attention was directed to the left side.  4-0 silk suture was placed to the upper eyelid margin.  There was dense symblepharon from the upper eyelid nasally to the bulbar conjunctiva all the way to the limbus and there was no visible superior fornix.  Similarly along the lower eyelid the nasal inferior fornix was obliterated due to symblepharon.  There was nasal trichiasis of both upper and lower eyelids.  Attention was first directed to the upper eyelid symblepharon.  The conjunctival scar was released from the upper eyelid margin and this dissection was carried all the way to the caruncle released into the superior fornix this did improve the upper eyelid  entropion and some, but that nasal quarter of lashes remained pointed downwards and cause trichiasis.  Decision was made to directly excise the lash line there.  A 15 blade was used to incise along the eyelid margin and the lash line along with the roots of those hair follicles were excised with Ravin scissors.  Once the conjunctival fornix was created superiorly attention was directed to the lower lid where the conjunctiva was incised several millimeters below the lid margin and symblepharon again was released all the way down into the inferior fornix releasing scar tissue and allowing the lid to roman into a more normal position.  Will send for fornix was recreated entropion repair was performed with a 6-0 Vicryl which was double-armed placed through the inferior fornix passed through the lower eyelid retractors and externalized under the lash line.  2 of these were placed and provided nice lid eversion.  To prevent recreation of symblepharon 4 mg of dexamethasone and a milliliter was injected subconjunctival in the upper and lower eyelids.  An 18 mm Kontour lens was placed over the eye.  Antibiotic ointment was administered.  We then released symblepharon and scar up into the superior nasal and fornix once the scar was. Waylon Corey tolerated the procedure well and left the operating room in stable condition.       Maninder Dickerson MD   This report was dictated using Dragon voice recognition software.

## 2024-12-06 NOTE — OR NURSING
U[ to BR voids light yellow X1- AOX3-VSS-O2 sats >92% RA- Good PO intake, Denies c/o- Pt and responsible adult verbalize understanding of discharge instructions, denies questions. Up in W/C - transported to door for discharge to home.

## 2024-12-06 NOTE — BRIEF OP NOTE
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 Sub conjunctival entropion., Left - Eye  Left upper eyelid entropion repair, Left - Eye    Surgeon: Surgeons and Role:     * Maninder Dickerson MD - Primary  Anesthesia: MAC   Estimated Blood Loss: Minimal    Drains: None  Specimens: * No specimens in log *  Findings:   None.  Complications: None.  Implants: * No implants in log *

## 2024-12-06 NOTE — OR NURSING
Pt dressed, up in recliner and transported to Phase 2. Pt tolerating oral fluids and applesauce. Pt denies pain. ABCDs intact.

## 2024-12-06 NOTE — DISCHARGE INSTRUCTIONS
Same Day Surgery Discharge Instructions for  Sedation and General Anesthesia     It's not unusual to feel dizzy, light-headed or faint for up to 24 hours after surgery or while taking pain medication.  If you have these symptoms: sit for a few minutes before standing and have someone assist you when you get up to walk or use the bathroom.    You should rest and relax for the next 24 hours. We recommend you make arrangements to have an adult stay with you for at least 24 hours after your discharge.  Avoid hazardous and strenuous activity.    DO NOT DRIVE any vehicle or operate mechanical equipment for 24 hours following the end of your surgery.  Even though you may feel normal, your reactions may be affected by the medication you have received.    Do not drink alcoholic beverages for 24 hours following surgery.     Slowly progress to your regular diet as you feel able. It's not unusual to feel nauseated and/or vomit after receiving anesthesia.  If you develop these symptoms, drink clear liquids (apple juice, ginger ale, broth, 7-up, etc. ) until you feel better.  If your nausea and vomiting persists for 24 hours, please notify your surgeon.      All narcotic pain medications, along with inactivity and anesthesia, can cause constipation. Drinking plenty of liquids and increasing fiber intake will help.    For any questions of a medical nature, call your surgeon.    Do not make important decisions for 24 hours.    If you had general anesthesia, you may have a sore throat for a couple of days related to the breathing tube used during surgery.  You may use Cepacol lozenges to help with this discomfort.  If it worsens or if you develop a fever, contact your surgeon.     If you feel your pain is not well managed with the pain medications prescribed by your surgeon, please contact your surgeon's office to let them know so they can address your concerns.          Post-operative Instructions  Ophthalmic Plastic and  Reconstructive Surgery    Maninder Dickerson M.D.     All instructions apply to the operated eye(s) or eyelid(s).    Wound care and personal care  ? Apply ice compresses and gentle pressure 15 minutes on, 15 minutes off, for 2 days. If you are sleeping, you don't need to wake up to ice. As long as there is no further bleeding, after two days, switch to warm water compresses for five minutes, four times a day until seen by your physician.   ? You may shower or wash your hair the day after surgery. Do not go swimming for at least 2 weeks to prevent contamination of your wounds.  ? You may go for walks and light activity is ok, but no heavy (over 15 pounds) lifting, bending or excessive straining for one week.   ? Do not apply make-up to the eyes or eyelids for 2 weeks after surgery.  ? Expect some swelling, bruising, black eye (even into the lower eyelids and cheeks). Also expect serum caking, crusting and discharge from the eye and/or incisions. A small amount of surface bleeding, and depending on the type of surgery, bleeding from the inside of the eyelid, is normal for the first 48 hours.  ? Avoid straining, bending at the waist, or lifting more than 15 pounds for 1 week. Sleeping with your head elevated, such as in a recliner, for the first several days can help swelling resolve more quickly.   ? Do continue to ambulate (walk) as you normally would - being sedentary after surgery can cause blood clots.   ? Your eye(s) and eyelid(s) may be painful and tender. This is normal after surgery.      Contact information and follow-up  ? Return to the Eye Clinic for a follow-up appointment with your physician as scheduled. If no appointment has been scheduled:   - [facility] eye clinic: [phone] for an appointment with Dr. Dickerson within 2 to 3 weeks from your date of surgery.    After hours or on weekends and holidays, call [phone] and ask to speak with the ophthalmologist on call.    An on call  person can be reached after hours for concerns. The on call doctor should not call in medication refill requests after hours or on weekends, so please plan accordingly. An effort has been made to provide adequate pain medications following every surgery, and refills will not be provided in most instances.     Medications  ? Restart all regular home medications and eye drops. If you take Plavix or Aspirin on a regular basis, wait for 72 hours after your surgery before restarting these in order to decrease the risk of bleeding complications.  ? Avoid aspirin and aspirin-like medications (Motrin, Aleve, Ibuprofen, Joanne-Brinkley etc) for 72 hours to reduce the risk of bleeding. You may take Tylenol (acetaminophen) for pain.  ? In addition to your home medications, take the following post-operative medications as prescribed by your physician.      ? Instill Maxitrol 3 times a day for 10 days.  ? Instill Vigamox in the left eye 4 times daily until follow up. This helps prevent infections of the cornea while you have a contact lens in the left eye.  ? If you have ocular irritation, you can use over the counter artificial tears such as Refresh, Systane, or Blink. Do not use Visine, Clear Eyes, or any other drop that gets the red out.   ? In many cases, postoperative discomfort can be managed with Tylenol alone. If narcotic pain medication was prescribed, take pain pills at prescribed frequency as needed for pain.            **If you have questions or concerns about your procedure,   call Dr. Dickerson at [phone] [facility] and [phone] [place]**

## 2024-12-17 ENCOUNTER — OFFICE VISIT (OUTPATIENT)
Dept: OPHTHALMOLOGY | Facility: CLINIC | Age: 89
End: 2024-12-17
Payer: COMMERCIAL

## 2024-12-17 DIAGNOSIS — Z98.890 POSTOPERATIVE EYE STATE: Primary | ICD-10-CM

## 2024-12-17 RX ORDER — MOXIFLOXACIN 5 MG/ML
1 SOLUTION/ DROPS OPHTHALMIC 4 TIMES DAILY
Qty: 3 ML | Refills: 3 | Status: SHIPPED | OUTPATIENT
Start: 2024-12-17

## 2024-12-17 ASSESSMENT — TONOMETRY
IOP_METHOD: ICARE
OD_IOP_MMHG: 7
OS_IOP_MMHG: 14

## 2024-12-17 ASSESSMENT — VISUAL ACUITY
METHOD: SNELLEN - LINEAR
OS_CC: 20/70
OD_CC: 20/30
OS_PH_CC: 20/60
CORRECTION_TYPE: GLASSES
OD_PH_CC: 20/30
OD_CC+: -2
OS_CC+: -1
OS_PH_CC+: -1

## 2024-12-17 ASSESSMENT — SLIT LAMP EXAM - LIDS: COMMENTS: NORMAL

## 2024-12-17 ASSESSMENT — REFRACTION_WEARINGRX
OS_SPHERE: PLANO
OS_CYLINDER: +0.25
OD_CYLINDER: +2.00
OD_AXIS: 173
OS_AXIS: 175
OD_SPHERE: -0.75

## 2024-12-17 NOTE — PROGRESS NOTES
"Chief Complaint(s) and History of Present Illness(es)     Post Op (Ophthalmology) Left Eye            Laterality: left eye    Associated symptoms: tearing and discharge.  Negative for eye pain    Pain scale: 0/10    Comments: Left conjunctivoplasty and recreation of superior and inferior   fornix. Sub conjunctival entropion.  Left upper eyelid entropion repair      Comments    No pain.    Pt having some tearing and discharge.  No change to vision.  Pt is mostly compliant with medications.   Pt has a hard time getting meds in the LE.  He also said his wife pokes him in the LE when she helps.  LINDSAY See December 17, 2024 12:26 PM    S/p Left superior and inferior fornix conjunctivoplasty with release of symblepharon and reformation of the fornix. Excision of left upper eyelid nasal lash line and left lower eyelid entropion repair. (12/6/24)    Nasal UL distichiasis with multiple lashes rubbing against cornea/conjunctiva, epilated today  Will replace contact lens and have patient continue vigamox QID OS    Patient Instructions   - Apply warm compresses for 1 minute two to three times a day until your bruising has resolved. You can continue this for one more month.   - Cool compresses can help with swelling and itching, you can alternate cool compresses with warm compresses if you find it helpful.  - Apply Aquaphor or Vaseline to the incision at bedtime until it is smooth.    - Stop neomycin/polymixin/dexamethasone drop. Continue vigamox drop four times daily.  - If you have symptoms of eye irritation, it is good to use over the counter artificial tears. Good brands include Refresh, Blink, and Systane. Do NOT get drops that are for \"red eyes.\"     Return in about 3 weeks (around 1/7/2025).      Attending Physician Attestation: Complete documentation of historical and exam elements from today's encounter can be found in the full encounter summary report (not reduplicated in this progress note). I personally " obtained the chief complaint(s) and history of present illness. I confirmed and edited as necessary the review of systems, past medical/surgical history, family history, social history, and examination findings as documented by others; and I examined the patient myself. I personally reviewed the relevant tests, images, and reports as documented above. I formulated and edited as necessary the assessment and plan and discussed the findings and management plan with the patient and family. I personally reviewed the ophthalmic test(s) associated with this encounter, agree with the interpretation(s) as documented by the resident/fellow, and have edited the corresponding report(s) as necessary. Maninder Dickerson MD

## 2024-12-17 NOTE — NURSING NOTE
Chief Complaints and History of Present Illnesses   Patient presents with    Post Op (Ophthalmology) Left Eye     Left conjunctivoplasty and recreation of superior and inferior fornix. Sub conjunctival entropion.  Left upper eyelid entropion repair         Chief Complaint(s) and History of Present Illness(es)       Post Op (Ophthalmology) Left Eye              Laterality: left eye    Associated symptoms: tearing and discharge.  Negative for eye pain    Pain scale: 0/10    Comments: Left conjunctivoplasty and recreation of superior and inferior fornix. Sub conjunctival entropion.  Left upper eyelid entropion repair                  Comments    No pain.    Pt having some tearing and discharge.  No change to vision.  Pt is mostly compliant with medications.   Pt has a hard time getting meds in the LE.  He also said his wife pokes him in the LE when she helps.  LINDSAY See December 17, 2024 12:26 PM

## 2024-12-17 NOTE — PATIENT INSTRUCTIONS
"- Apply warm compresses for 1 minute two to three times a day until your bruising has resolved. You can continue this for one more month.   - Cool compresses can help with swelling and itching, you can alternate cool compresses with warm compresses if you find it helpful.  - Apply Aquaphor or Vaseline to the incision at bedtime until it is smooth.    - Stop neomycin/polymixin/dexamethasone drop. Continue vigamox drop four times daily.  - If you have symptoms of eye irritation, it is good to use over the counter artificial tears. Good brands include Refresh, Blink, and Systane. Do NOT get drops that are for \"red eyes.\"     "

## 2025-01-14 ENCOUNTER — OFFICE VISIT (OUTPATIENT)
Dept: OPHTHALMOLOGY | Facility: CLINIC | Age: OVER 89
End: 2025-01-14
Payer: COMMERCIAL

## 2025-01-14 DIAGNOSIS — H02.056: ICD-10-CM

## 2025-01-14 DIAGNOSIS — H02.016 CICATRICIAL ENTROPION OF LEFT EYE: ICD-10-CM

## 2025-01-14 DIAGNOSIS — H11.232 SYMBLEPHARON, LEFT EYE: Primary | ICD-10-CM

## 2025-01-14 PROCEDURE — 99024 POSTOP FOLLOW-UP VISIT: CPT | Performed by: OPHTHALMOLOGY

## 2025-01-14 PROCEDURE — 67820 REVISE EYELASHES: CPT | Mod: 58 | Performed by: OPHTHALMOLOGY

## 2025-01-14 ASSESSMENT — SLIT LAMP EXAM - LIDS: COMMENTS: NORMAL

## 2025-01-14 ASSESSMENT — VISUAL ACUITY
METHOD: SNELLEN - LINEAR
OD_CC: 20/25
CORRECTION_TYPE: GLASSES

## 2025-01-14 ASSESSMENT — TONOMETRY
IOP_METHOD: ICARE
OD_IOP_MMHG: 8

## 2025-01-14 NOTE — PATIENT INSTRUCTIONS
Follow up in about 4 weeks at [facility] to have Dr. Xiao change your contact lens and pluck lashes. I would like to see you in 8 weeks, and it will be important that you come with a . We will try to excise the area of lashes rubbing on your eye at that visit. Continue using your moxifloxacin drops in the left eye three times a day.

## 2025-01-14 NOTE — LETTER
2025         RE:  :  MRN: Waylon Corey  [id number]  [id number]     Dear Dr. Xiao    Thank you for asking me to see your patient, Waylon Corey, for an oculoplastic   consultation.  My assessment and plan are below.  For further details, please see my attached clinic note.      Chief Complaint(s) and History of Present Illness(es)     Post Op (Ophthalmology) Left Eye            Laterality: left eye    Onset: 1 month ago    Severity: mild    Frequency: constantly    Course: gradually improving    Response to treatment: mild improvement    Pain scale: 0/10    Comments: Status post Left superior and inferior fornix conjunctivoplasty   with release of symblepharon and reformation of the fornix. Excision of   left upper eyelid nasal lash line and left lower eyelid entropion repair.   24.            Comments    Not sure if it is healing as he does not know what normal is any more.    Does not see well out of left eye.  If he is driving and makes left hand   turn he does not see as well as it is blurry.  Denies pain.      Maria Fernanda Richardson on 2025 at 2:02 PM                   Assessment & Plan     Waylon Corey is a 89 year old male with the following diagnoses:   Encounter Diagnoses   Name Primary?    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 He underwent release of symblepharon and reformation of the superior and inferior fornix 2024. His lower eyelid is nicely everted, but he has developed recurrent entropion of the nasal upper eyelid and a dense band of symbelpharon. DIF has been negative for OCP.     About 10 lashes epilated nasal left upper eyelid today. Replaced his bandage contact lens.     I asked that he see Dr. Xiao for bandage contact lens replacement and epilation in about 3-4 weeks then return in about 6-8 weeks for in clinic pentagonal wedge + steroid  injection, can send for DIF again as well as in formalin. Release symbelpharon and sub conj steroid injection. Place contact lens.         Patient disposition:   Return in about 8 weeks (around 3/11/2025) for Procedure: TASHI wedge and steroid injection (need Kleber and formalin). Kontour lens..         Again, thank you for allowing me to participate in the care of your patient.      Sincerely,    Maninder Dickerson MD  Department of Ophthalmology and Visual Neurosciences  [facility]    CC: Srinath Xiao MD  [facility]  [address]   [place] [address]  Via Fax: [phone]

## 2025-01-14 NOTE — NURSING NOTE
Chief Complaints and History of Present Illnesses   Patient presents with    Post Op (Ophthalmology) Left Eye     Status post Left superior and inferior fornix conjunctivoplasty with release of symblepharon and reformation of the fornix. Excision of left upper eyelid nasal lash line and left lower eyelid entropion repair. 12/6/24.       Chief Complaint(s) and History of Present Illness(es)       Post Op (Ophthalmology) Left Eye              Laterality: left eye    Onset: 1 month ago    Severity: mild    Frequency: constantly    Course: gradually improving    Response to treatment: mild improvement    Pain scale: 0/10    Comments: Status post Left superior and inferior fornix conjunctivoplasty with release of symblepharon and reformation of the fornix. Excision of left upper eyelid nasal lash line and left lower eyelid entropion repair. 12/6/24.                Comments    Not sure if it is healing as he does not know what normal is any more.  Does not see well out of left eye.  If he is driving and makes left hand turn he does not see as well as it is blurry.  Denies pain.      Maria Fernanda Richardson on 1/14/2025 at 2:02 PM

## 2025-01-14 NOTE — PROGRESS NOTES
Chief Complaint(s) and History of Present Illness(es)     Post Op (Ophthalmology) Left Eye            Laterality: left eye    Onset: 1 month ago    Severity: mild    Frequency: constantly    Course: gradually improving    Response to treatment: mild improvement    Pain scale: 0/10    Comments: Status post Left superior and inferior fornix conjunctivoplasty   with release of symblepharon and reformation of the fornix. Excision of   left upper eyelid nasal lash line and left lower eyelid entropion repair.   12/6/24.            Comments    Not sure if it is healing as he does not know what normal is any more.    Does not see well out of left eye.  If he is driving and makes left hand   turn he does not see as well as it is blurry.  Denies pain.      Maria Fernanda Richardson on 1/14/2025 at 2:02 PM                   Assessment & Plan     Waylon Corey is a 89 year old male with the following diagnoses:   Encounter Diagnoses   Name Primary?    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 He underwent release of symblepharon and reformation of the superior and inferior fornix 12/6/2024. His lower eyelid is nicely everted, but he has developed recurrent entropion of the nasal upper eyelid and a dense band of symbelpharon. DIF has been negative for OCP.     About 10 lashes epilated nasal left upper eyelid today. Replaced his bandage contact lens.     I asked that he see Dr. Xiao for bandage contact lens replacement and epilation in about 3-4 weeks then return in about 6-8 weeks for in clinic pentagonal wedge + steroid injection, can send for DIF again as well as in formalin. Release symbelpharon and sub conj steroid injection. Place contact lens.         Patient disposition:   Return in about 8 weeks (around 3/11/2025) for Procedure: TASHI wedge and steroid injection (need Kleber and formalin).  Kontour lens..        Attending Physician Attestation: Complete documentation of historical and exam elements from today's encounter can be found in the full encounter summary report (not reduplicated in this progress note). I personally obtained the chief complaint(s) and history of present illness. I confirmed and edited as necessary the review of systems, past medical/surgical history, family history, social history, and examination findings as documented by others; and I examined the patient myself. I personally reviewed the relevant tests, images, and reports as documented above. I formulated and edited as necessary the assessment and plan and discussed the findings and management plan with the patient.  -Maninder Dickerson MD

## 2025-02-21 ENCOUNTER — TRANSFERRED RECORDS (OUTPATIENT)
Dept: HEALTH INFORMATION MANAGEMENT | Facility: CLINIC | Age: OVER 89
End: 2025-02-21
Payer: COMMERCIAL

## 2025-03-18 ENCOUNTER — OFFICE VISIT (OUTPATIENT)
Dept: OPHTHALMOLOGY | Facility: CLINIC | Age: OVER 89
End: 2025-03-18
Payer: COMMERCIAL

## 2025-03-18 DIAGNOSIS — H50.89 RESTRICTIVE STRABISMUS: ICD-10-CM

## 2025-03-18 DIAGNOSIS — H11.232 SYMBLEPHARON, LEFT EYE: Primary | ICD-10-CM

## 2025-03-18 DIAGNOSIS — H02.056 TRICHIASIS OF LEFT EYE WITHOUT ENTROPION: ICD-10-CM

## 2025-03-18 PROCEDURE — 67961 REVISION OF EYELID: CPT | Mod: E1 | Performed by: OPHTHALMOLOGY

## 2025-03-18 PROCEDURE — 88342 IMHCHEM/IMCYTCHM 1ST ANTB: CPT | Mod: TC | Performed by: OPHTHALMOLOGY

## 2025-03-18 PROCEDURE — 88346 IMFLUOR 1ST 1ANTB STAIN PX: CPT | Mod: 26 | Performed by: DERMATOLOGY

## 2025-03-18 PROCEDURE — 68340 SEPARATE EYELID ADHESIONS: CPT | Mod: LT | Performed by: OPHTHALMOLOGY

## 2025-03-18 PROCEDURE — 88305 TISSUE EXAM BY PATHOLOGIST: CPT | Mod: 26 | Performed by: DERMATOLOGY

## 2025-03-18 PROCEDURE — 88312 SPECIAL STAINS GROUP 1: CPT | Mod: 26 | Performed by: DERMATOLOGY

## 2025-03-18 PROCEDURE — 88346 IMFLUOR 1ST 1ANTB STAIN PX: CPT | Mod: TC | Performed by: OPHTHALMOLOGY

## 2025-03-18 PROCEDURE — 88350 IMFLUOR EA ADDL 1ANTB STN PX: CPT | Mod: 26 | Performed by: DERMATOLOGY

## 2025-03-18 PROCEDURE — 88305 TISSUE EXAM BY PATHOLOGIST: CPT | Mod: TC | Performed by: OPHTHALMOLOGY

## 2025-03-18 PROCEDURE — 88342 IMHCHEM/IMCYTCHM 1ST ANTB: CPT | Mod: 26 | Performed by: DERMATOLOGY

## 2025-03-18 PROCEDURE — 67820 REVISE EYELASHES: CPT | Mod: E2 | Performed by: OPHTHALMOLOGY

## 2025-03-18 RX ORDER — ERYTHROMYCIN 5 MG/G
OINTMENT OPHTHALMIC ONCE
Status: COMPLETED | OUTPATIENT
Start: 2025-03-18 | End: 2025-03-18

## 2025-03-18 RX ORDER — PREDNISOLONE ACETATE 10 MG/ML
1 SUSPENSION/ DROPS OPHTHALMIC 4 TIMES DAILY
Qty: 10 ML | Refills: 0 | Status: SHIPPED | OUTPATIENT
Start: 2025-03-18 | End: 2025-04-08

## 2025-03-18 RX ORDER — MOXIFLOXACIN 5 MG/ML
1 SOLUTION/ DROPS OPHTHALMIC 3 TIMES DAILY
Qty: 3 ML | Refills: 0 | Status: SHIPPED | OUTPATIENT
Start: 2025-03-18

## 2025-03-18 RX ORDER — TRIAMCINOLONE ACETONIDE 40 MG/ML
40 INJECTION, SUSPENSION INTRA-ARTICULAR; INTRAMUSCULAR ONCE
Status: COMPLETED | OUTPATIENT
Start: 2025-03-18 | End: 2025-03-18

## 2025-03-18 RX ADMIN — ERYTHROMYCIN 1 G: 5 OINTMENT OPHTHALMIC at 12:24

## 2025-03-18 RX ADMIN — TRIAMCINOLONE ACETONIDE 40 MG: 40 INJECTION, SUSPENSION INTRA-ARTICULAR; INTRAMUSCULAR at 12:25

## 2025-03-18 NOTE — PROGRESS NOTES
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  He has previously undergone lysis of symblepharon and biopsy which was inconclusive.  Unfortunately he developed severe recurrence.  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  We discussed response alternatives to the post procedure and he elected proceed.    Description of procedure:    Waylon Corey was brought to the minor procedure room and laid supine on the table.  1% lidocaine with epinephrine was injected into the left upper eyelid as well as the left medial canthal area in the area of dense symblepharon and left lower eyelid.  He was prepped and draped in typical sterile fashion.  Attention was directed to the left side.  Ravin scissors were used to lyse symblepharon starting at the upper eyelid margin releasing the upper eyelid margin from the bulbar conjunctiva extending along the area of the caruncle and extending into the nasal lower eyelid.  Once adequate conjunctival release was achieved forced ductions were performed and the globe was still restricted in abduction.  Further release was carried out of scar tissue going back until I reached the area of the medial rectus muscle.  Once there was adequate release he was asked to abduct his eye and there was much improved abduction.  Kenalog-40 was injected into the upper eyelid lower eyelid and medial canthal area in the areas of the symblepharon.  Attention was directed to the area of dense trichiasis of the nasal left upper eyelid.  The area was marked out with a marking pen involved about 20% of the upper eyelid margin.  Ravin scissors were used to incise through the eyelid margin and the area was resected.  The specimen was split in half and half was sent in formalin in the second half in Kleber medium for evaluation  for mucous membrane pemphigoid.  Hemostasis was assured.  The lid margin was reconstructed with a vertical mattress 6-0 Vicryl suture and the tarsal plate was reconstructed with lamellar 6-0 Vicryl sutures the lash line was also reconstructed with the same 6-0 Vicryl suture.  Skin was closed with 6-0 plain gut sutures.  There were several misdirected lashes involving the lower eyelid.  These were epilated with cilia forceps.  Erythromycin ophthalmic ointment was applied.  A contour 18 mm lens was applied.  A prescription for moxifloxacin and Pred forte was also provided.  He was instructed to continue this until his follow-up visit when his contact lenses removed. I was present for the entire procedure. Maninder Dickerson MD

## 2025-03-18 NOTE — PATIENT INSTRUCTIONS
Use cold compresses for the first 48 hours while awake to minimize bruising and swelling. It works well to put a washcloth in a bowl of ice water and use the cold washcloth.     Use a warm compress 5-10 minutes 4 times per day for the next 2 days or as needed prior to next appointment.     Apply the prescribed/provided ointment to the incision three times a day for 5 days.      Apply Vigamox and Pred Forte to left eye 4 times per day until your next appointment.

## 2025-04-08 ENCOUNTER — OFFICE VISIT (OUTPATIENT)
Dept: OPHTHALMOLOGY | Facility: CLINIC | Age: OVER 89
End: 2025-04-08
Payer: COMMERCIAL

## 2025-04-08 DIAGNOSIS — H02.056 TRICHIASIS OF LEFT EYE WITHOUT ENTROPION: Primary | ICD-10-CM

## 2025-04-08 DIAGNOSIS — H11.232 SYMBLEPHARON, LEFT EYE: ICD-10-CM

## 2025-04-08 RX ORDER — PREDNISOLONE ACETATE 10 MG/ML
SUSPENSION/ DROPS OPHTHALMIC
Qty: 10 ML | Refills: 0 | Status: SHIPPED | OUTPATIENT
Start: 2025-04-08 | End: 2025-04-29

## 2025-04-08 ASSESSMENT — VISUAL ACUITY
OD_CC: 20/25
OS_PH_CC: 20/100
METHOD: SNELLEN - LINEAR
CORRECTION_TYPE: GLASSES
OS_CC: 20/100
OS_PH_CC+: +1
OD_CC+: -3

## 2025-04-08 ASSESSMENT — SLIT LAMP EXAM - LIDS: COMMENTS: NORMAL

## 2025-04-08 ASSESSMENT — TONOMETRY
OD_IOP_MMHG: 8
OS_IOP_MMHG: 15
IOP_METHOD: TONOPEN

## 2025-04-08 NOTE — PROGRESS NOTES
Chief Complaint(s) and History of Present Illness(es)     Follow Up            Laterality: left eye    Pain scale: 0/10    Comments: Follow up S/P Symblepharon with biopsy along with Epilation of   misdirected lashes on LLL 3/18/25         Comments    No pain at all.   Does have some some concerns that with looking to left seeing double   Seeing two car when on the road. Unsure which is the real one.   Patient questions if double is due contact lens present left eye. States   this may have been an issue prior to this last surgery but definitely   noticing now.   Using eye drops he states reasonably consistently however can forget to   take bed time drop. Running low on one of the eye drops so may need   refill.   He also states he get his eye drops from his pharmacy that he pays for.   However he is getting billed from the drops for [facility] as well.       Reports that he has had some unintended weight loss. CT pending to   evaluate cause.     Verenice Ordaz, COT COT 12:26 PM April 8, 2025     Left upper eyelid biopsy 3/18/2025:  - Ulcer and superficial to deep dermal and lamina propria fibrosis with mixed inflammation - (see comment and description)  - Negative immunofluorescence study - (see description)        Follow up S/P Symblepharon with biopsy along with Epilation of misdirected lashes on LLL 3/18/25 and eyelid kenalog injection. Specimen sent for pemphigoid evaluation - negative. Notices new double vision since the procedure when looking in far left gaze that was not present before - worried about his driving. No trichiasis. Still with contact lens in left eye.  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 Biopsy inconclusive for OCP, which doesn't rule it out. Post left upper eyelid wedge resection and release of symblepharon his left upper and lower eyelid margin are in excellent position  and there is resolution of symblepharon. He does have reformation of symblepharon far nasally where the Kontour lens did not cover, and this is causing restrictive strabismus on abduction. Fortunately no diplopia in other areas of gaze.     Removed Kontour today. Stop moxi. Taper pred 3-2-1 weekly. Sees Dr. Xiao in 1 month. Follow up with me in 2-3 months. If no recurrent trichiasis can  how symptomatic he is of the restrictive strabismus. This may be hard to address.     Attending Physician Attestation: Complete documentation of historical and exam elements from today's encounter can be found in the full encounter summary report (not reduplicated in this progress note). I personally obtained the chief complaint(s) and history of present illness. I confirmed and edited as necessary the review of systems, past medical/surgical history, family history, social history, and examination findings as documented by others; and I examined the patient myself. I personally reviewed the relevant tests, images, and reports as documented above. I formulated and edited as necessary the assessment and plan and discussed the findings and management plan with the patient and family. I personally reviewed the ophthalmic test(s) associated with this encounter, agree with the interpretation(s) as documented by the resident/fellow, and have edited the corresponding report(s) as necessary. Maninder Dickerson MD

## 2025-04-08 NOTE — LETTER
2025         RE:  :  MRN: Waylon Corey  1935  [id number]     Dear Dr. Xiao,    Your patient, Waylon Corey, returned for oculoplastic follow up. My assessment and plan are below.  For further details, please see my attached clinic note.      Chief Complaint(s) and History of Present Illness(es)     Follow Up            Laterality: left eye    Pain scale: 0/10    Comments: Follow up S/P Symblepharon with biopsy along with Epilation of   misdirected lashes on LLL 3/18/25         Comments    No pain at all.   Does have some some concerns that with looking to left seeing double   Seeing two car when on the road. Unsure which is the real one.   Patient questions if double is due contact lens present left eye. States   this may have been an issue prior to this last surgery but definitely   noticing now.   Using eye drops he states reasonably consistently however can forget to   take bed time drop. Running low on one of the eye drops so may need   refill.   He also states he get his eye drops from his pharmacy that he pays for.   However he is getting billed from the drops for [facility] as well.       Reports that he has had some unintended weight loss. CT pending to   evaluate cause.     Verenice Ordaz, COT COT 12:26 PM 2025     Left upper eyelid biopsy 3/18/2025:  - Ulcer and superficial to deep dermal and lamina propria fibrosis with mixed inflammation - (see comment and description)  - Negative immunofluorescence study - (see description)        Follow up S/P Symblepharon with biopsy along with Epilation of misdirected lashes on LLL 3/18/25 and eyelid kenalog injection. Specimen sent for pemphigoid evaluation - negative. Notices new double vision since the procedure when looking in far left gaze that was not present before - worried about his driving. No trichiasis. Still with contact lens in left eye.  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 Biopsy inconclusive for OCP, which doesn't rule it out. Post left upper eyelid wedge resection and release of symblepharon his left upper and lower eyelid margin are in excellent position and there is resolution of symblepharon. He does have reformation of symblepharon far nasally where the Kontour lens did not cover, and this is causing restrictive strabismus on abduction. Fortunately no diplopia in other areas of gaze.     Removed Kontour today. Stop moxi. Taper pred 3-2-1 weekly. Sees Dr. Xiao in 1 month. Follow up with me in 2-3 months. If no recurrent trichiasis can  how symptomatic he is of the restrictive strabismus. This may be hard to address.     Again, thank you for allowing me to participate in the care of your patient.      Sincerely,    Maninder Dickerson MD  Department of Ophthalmology and Visual Neurosciences  [facility]      CC: Srinath Xiao MD  [facility]  [address] [place] [address]  Via Fax: [phone]

## 2025-04-08 NOTE — NURSING NOTE
Chief Complaints and History of Present Illnesses   Patient presents with    Follow Up     Follow up S/P Symblepharon with biopsy along with Epilation of misdirected lashes on LLL 3/18/25       Chief Complaint(s) and History of Present Illness(es)       Follow Up              Laterality: left eye    Pain scale: 0/10    Comments: Follow up S/P Symblepharon with biopsy along with Epilation of misdirected lashes on LLL 3/18/25                Comments    No pain at all.   Does have some some concerns that with looking to left seeing double   Seeing two car when on the road. Unsure which is the real one.   Patient questions if double is due contact lens present left eye. States this may have been an issue prior to this last surgery but definitely noticing now.   Using eye drops he states reasonably consistently however can forget to take bed time drop. Running low on one of the eye drops so may need refill.   He also states he get his eye drops from his pharmacy that he pays for. However he is getting billed from the drops for [place] [facility] as well.     Reports that he has had some unintended weight loss. CT pending to evaluate cause.     Verenice Ordaz, COT COT 12:26 PM April 8, 2025

## 2025-04-08 NOTE — NURSING NOTE
Chief Complaints and History of Present Illnesses   Patient presents with    Follow Up     Follow up S/P Symblepharon with biopsy along with Epilation of misdirected lashes on LLL 3/18/25       Chief Complaint(s) and History of Present Illness(es)       Follow Up              Laterality: left eye    Pain scale: 0/10    Comments: Follow up S/P Symblepharon with biopsy along with Epilation of misdirected lashes on LLL 3/18/25                Comments    No pain at all.   Does have some some concerns that with looking to left seeing double   Seeing two car when on the road. Unsure which is the real one.   Patient questions if double is due contact lens present left eye. States this may have been an issue prior to this last surgery but definitely noticing now.   Using eye drops he states reasonably consistently however can forget to take bed time drop. Running low on one of the eye drops so may need refill.   He also states he get his eye drops from his pharmacy that he pays for. However he is getting billed from the drops for [facility] pharmacy as well.     Verenice Ordaz, COT COT 12:26 PM April 8, 2025

## (undated) DEVICE — PACK OCULOPLATIC SEN15OCFSD

## (undated) DEVICE — SUCTION MANIFOLD NEPTUNE 2 SYS 1 PORT 702-025-000

## (undated) DEVICE — TUBING SUCTION MEDI-VAC 1/4"X20' N620A - HE

## (undated) DEVICE — SU VICRYL 6-0 S-24DA 12" J552G

## (undated) DEVICE — SU PLAIN 6-0 G-1DA 18" 770G

## (undated) DEVICE — DECANTER BAG 2002S

## (undated) DEVICE — EYE PREP BETADINE 5% SOLUTION 30ML 0065-0411-30

## (undated) DEVICE — SOL WATER IRRIG 1000ML BOTTLE 2F7114

## (undated) DEVICE — SU SILK 4-0 G-3DA 18" 783G

## (undated) DEVICE — GLOVE BIOGEL PI MICRO SZ 7.5 48575

## (undated) DEVICE — ESU EYE HIGH TEMP 65410-183

## (undated) DEVICE — LINEN TOWEL PACK X5 5464

## (undated) RX ORDER — TRIAMCINOLONE ACETONIDE 40 MG/ML
INJECTION, SUSPENSION INTRA-ARTICULAR; INTRAMUSCULAR
Status: DISPENSED
Start: 2025-03-18

## (undated) RX ORDER — ERYTHROMYCIN 5 MG/G
OINTMENT OPHTHALMIC
Status: DISPENSED
Start: 2025-03-18

## (undated) RX ORDER — LIDOCAINE AND PRILOCAINE 25; 25 MG/G; MG/G
CREAM TOPICAL
Status: DISPENSED
Start: 2025-03-18